# Patient Record
Sex: FEMALE | Race: WHITE | NOT HISPANIC OR LATINO
[De-identification: names, ages, dates, MRNs, and addresses within clinical notes are randomized per-mention and may not be internally consistent; named-entity substitution may affect disease eponyms.]

---

## 2022-06-01 PROBLEM — Z00.00 ENCOUNTER FOR PREVENTIVE HEALTH EXAMINATION: Status: ACTIVE | Noted: 2022-06-01

## 2022-06-02 ENCOUNTER — APPOINTMENT (OUTPATIENT)
Dept: ULTRASOUND IMAGING | Facility: CLINIC | Age: 72
End: 2022-06-02
Payer: MEDICARE

## 2022-06-02 PROCEDURE — 76641 ULTRASOUND BREAST COMPLETE: CPT | Mod: RT

## 2022-11-11 ENCOUNTER — APPOINTMENT (OUTPATIENT)
Dept: ULTRASOUND IMAGING | Facility: CLINIC | Age: 72
End: 2022-11-11

## 2022-11-11 PROCEDURE — 76856 US EXAM PELVIC COMPLETE: CPT

## 2022-12-19 ENCOUNTER — APPOINTMENT (OUTPATIENT)
Dept: MAMMOGRAPHY | Facility: CLINIC | Age: 72
End: 2022-12-19

## 2022-12-19 ENCOUNTER — APPOINTMENT (OUTPATIENT)
Dept: ULTRASOUND IMAGING | Facility: CLINIC | Age: 72
End: 2022-12-19

## 2022-12-19 PROCEDURE — 77063 BREAST TOMOSYNTHESIS BI: CPT

## 2022-12-19 PROCEDURE — 77067 SCR MAMMO BI INCL CAD: CPT

## 2022-12-19 PROCEDURE — 76641 ULTRASOUND BREAST COMPLETE: CPT | Mod: 50

## 2023-02-02 ENCOUNTER — APPOINTMENT (OUTPATIENT)
Dept: RADIOLOGY | Facility: CLINIC | Age: 73
End: 2023-02-02
Payer: MEDICARE

## 2023-02-02 PROCEDURE — 77080 DXA BONE DENSITY AXIAL: CPT

## 2023-10-16 ENCOUNTER — EMERGENCY (EMERGENCY)
Facility: HOSPITAL | Age: 73
LOS: 1 days | Discharge: HOME | End: 2023-10-16
Attending: EMERGENCY MEDICINE | Admitting: EMERGENCY MEDICINE
Payer: MEDICARE

## 2023-10-16 VITALS
HEIGHT: 66 IN | SYSTOLIC BLOOD PRESSURE: 117 MMHG | RESPIRATION RATE: 18 BRPM | OXYGEN SATURATION: 95 % | TEMPERATURE: 99 F | DIASTOLIC BLOOD PRESSURE: 75 MMHG | HEART RATE: 90 BPM | WEIGHT: 184.97 LBS

## 2023-10-16 PROCEDURE — 72125 CT NECK SPINE W/O DYE: CPT | Mod: 26,MH

## 2023-10-16 PROCEDURE — 99284 EMERGENCY DEPT VISIT MOD MDM: CPT

## 2023-10-16 PROCEDURE — 70450 CT HEAD/BRAIN W/O DYE: CPT | Mod: 26,MH

## 2023-10-16 PROCEDURE — 70486 CT MAXILLOFACIAL W/O DYE: CPT | Mod: 26,MH

## 2023-10-16 RX ORDER — TETANUS TOXOID, REDUCED DIPHTHERIA TOXOID AND ACELLULAR PERTUSSIS VACCINE, ADSORBED 5; 2.5; 8; 8; 2.5 [IU]/.5ML; [IU]/.5ML; UG/.5ML; UG/.5ML; UG/.5ML
0.5 SUSPENSION INTRAMUSCULAR ONCE
Refills: 0 | Status: COMPLETED | OUTPATIENT
Start: 2023-10-16 | End: 2023-10-16

## 2023-10-16 RX ADMIN — Medication 300 MILLIGRAM(S): at 15:00

## 2023-10-16 RX ADMIN — TETANUS TOXOID, REDUCED DIPHTHERIA TOXOID AND ACELLULAR PERTUSSIS VACCINE, ADSORBED 0.5 MILLILITER(S): 5; 2.5; 8; 8; 2.5 SUSPENSION INTRAMUSCULAR at 13:24

## 2023-10-16 NOTE — ED PROVIDER NOTE - PHYSICAL EXAMINATION
VITAL SIGNS: I have reviewed nursing notes and confirm.  CONST: Well appearing; No apparent distress.  ENT: +Abrasion and superficial laceration to left upper lip mucosal surface. No gaping laceration. +2 broken upper teeth.   EYES: Sclera clear. Pupils round and symmetrical bilaterally.  RESP: Breathing comfortably; speaking in full sentences.   MSK: No acute deformities noted to extremities. No tenderness to cervical/thoracic/lumbar spine to palpation.  NEURO: Alert, oriented. Speech is fluent and appropriate. Moving all extremities appropriately. No gross motor or sensory abnormalities.  SKIN: +Abrasions to bilateral palms without bony tenderness to palpation of hands or wrists.   PSYCH: Cooperative. Appropriate mood, language, and behavior.

## 2023-10-16 NOTE — ED ADULT NURSE NOTE - NSFALLRISKASMTTYPE_ED_ALL_ED
Initial (On Arrival)

## 2023-10-16 NOTE — ED ADULT TRIAGE NOTE - CHIEF COMPLAINT QUOTE
Pt came in c/o mechanical trip and fall pta. Pt reports chipping her front teeth. Denies loc or ac use.

## 2023-10-16 NOTE — ED PROVIDER NOTE - NSFOLLOWUPINSTRUCTIONS_ED_ALL_ED_FT
Follow up with your dentist ASAP.  Stick to a liquid diet with a straw until you see your dentist.      Tooth Injuries  Tooth injuries include cracked or broken teeth, teeth that have been dislodged or moved out of place, and teeth that have been knocked out of the mouth.    Severe tooth injuries need to be treated quickly to save the tooth. However, sometimes it is not possible to save a tooth after an injury, and the tooth may need to be removed.    What are the causes?  Tooth injuries may be caused by any force that is strong enough to chip, break, dislodge, or knock out a tooth. The injuries may come from:  Sports accidents.  Falls.  Fights.  What increases the risk?  The following factors may make you more likely to lose a tooth:  Playing contact sports, such as football or boxing, without using a mouth guard.  Any medical condition that increases the risk of falling or fainting.  Anything that causes injury to the face.  Any condition that reduces the support of the root of the tooth.  What are the signs or symptoms?  Symptoms of this condition include a tooth that:  May have moved out of position.  May have moved into or out of the tooth socket.  May not be visible in the gums, if the fracture was severe.  Other symptoms of a tooth injury include:  Pain, especially when chewing.  A loose tooth.  Bleeding in or around the tooth.  Swelling or bruising near the tooth.  Swelling or bruising of the lip over the injured tooth.  Increased tooth sensitivity to heat and cold.  A tooth that is knocked out of its place in the gum.  How is this diagnosed?  A tooth injury can be diagnosed with a complete history and a physical exam. You may also need dental X-rays to check for injuries to the root of the tooth.    How is this treated?  Treatment depends on the type of injury and its severity. Treatment may need to be done quickly to save your tooth. Possible treatments include:  Replacing a tooth fragment with a filling, a cap, or a hard, protective cover (crown). This may be an option for a chip or fracture that does not affect the inside of your tooth.  Repairing the inside of the tooth (root canal), if the dentist thinks it is necessary.  The root canal usually needs to be done within a few days of the injury. This may be done to treat a tooth fracture that affects the pulp.  Repositioning a dislodged tooth.  Using a brace or splint to hold the tooth in place.  Replacing a knocked-out tooth in the socket, if possible, and then doing a root canal.  Extracting a tooth. This is done for a fracture that extends below the gums or a fracture that splits the tooth completely.  Taking medicine, including:  Pain medicine.  Antibiotic medicine to help prevent infection.  Follow these instructions at home:  Medicines    Take over-the-counter and prescription medicines only as told by your dentist.  Take your antibiotic medicine as told by your dentist. Do not stop taking the antibiotic even if you start to feel better.  Do not drive or use heavy machinery while taking prescription pain medicine.  Caring for your teeth      Do not eat or chew on very hard objects. These include ice cubes, pens, pencils, hard candy, and popcorn kernels.  Do not clench or grind your teeth. Tell your dentist if you grind your teeth while you sleep.  Brush your teeth gently as directed by your dentist.  Do not use your teeth to open packages.  Always wear mouth protection when you play contact sports.  Managing pain and swelling    Gargle with a mixture of salt and water 3–4 times a day or as needed. To make salt water, completely dissolve ½–1 tsp (3–6 g) of salt in 1 cup (237 mL) of warm water.  If directed, apply ice to your mouth near the injured tooth:  Put ice in a plastic bag.  Place a towel between your skin and the bag.  Leave the ice on for 20 minutes, 2–3 times a day.  Remove the ice if your skin turns bright red. This is very important. If you cannot feel pain, heat, or cold, you have a greater risk of damage to the area.  General instructions    Do not use any products that contain nicotine or tobacco. These products include cigarettes, chewing tobacco, and vaping devices, such as e-cigarettes. If you need help quitting, ask your health care provider.  Your health care provider may recommend that you eat certain foods. This may include eating only soft foods.  Check the injured area every day for signs of infection. Watch for:  Redness, swelling, or pain.  Fluid, blood, or pus.  Keep all follow-up visits. This is important.  Contact a dental care provider if:  Your pain gets much worse, even after you take pain medicine.  You have pus coming from the site of the tooth injury.  You develop swelling near your injured tooth.  You have a tooth splint, and it becomes loose.  Your tooth becomes loose.  Get help right away if:  You have swelling in the face.  You have a fever.  You have bleeding near the tooth that does not stop in 10 minutes.  You have trouble swallowing.  You have trouble opening your mouth.  Your permanent tooth comes out after it is repositioned.  Summary  Tooth injuries include cracked or broken teeth and teeth that have been dislodged or knocked out of the mouth.  A tooth injury can be diagnosed with a medical history and a physical exam. You may also need dental X-rays to check for injuries to the root of the tooth.  Treatment depends on the type of injury you have and its severity. Treatment may need to be done quickly to save your tooth.  This information is not intended to replace advice given to you by your health care provider. Make sure you discuss any questions you have with your health care provider.

## 2023-10-16 NOTE — ED PROVIDER NOTE - CROS ED GI ALL NEG
Health Maintenance Due   Topic Date Due   • COVID-19 Vaccine (1) Never done   • Influenza Vaccine (1) 09/01/2022   • Annual Physical (ages 3-18)  03/21/2023       Patient is due for topics listed above, she wishes to proceed with Annual Wellness Visit (ages 3-18), but is not proceeding with Immunization(s) COVID-19 and Influenza at this time.      negative...

## 2023-10-16 NOTE — ED PROVIDER NOTE - PATIENT PORTAL LINK FT
You can access the FollowMyHealth Patient Portal offered by [facility] by registering at the following website: [email or web address]. By joining [facility]’s FollowMyHealth portal, you will also be able to view your health information using other applications (apps) compatible with our system.

## 2023-10-16 NOTE — ED PROVIDER NOTE - PROGRESS NOTE DETAILS
Imaging negative.  Pt has follow up w dentist within 48h.  Instructed to keep to a liquid diet until then.  No dental kit in ED.  Pt will get topical covering for broken teeth over the counter.  Will give PO clindamycin (pt has penicillin allergy) for infection ppx.

## 2023-10-16 NOTE — ED PROVIDER NOTE - OBJECTIVE STATEMENT
74 y/o F with no significant Hx presents s/p mechanical trip and fall across the street with abrasion to inner upper lip and chipped teeth after landing on her outstretched hands and hitting her upper lip. She reports she was at a pool class at the [employer] across the street this morning and as she was leaving she tripped on the sidewalk, two bystanders helped her up and walked her to the ED. Denies any pain at this time. Denies LOC, no nausea, no vomiting. Not on blood thinners. Last tetanus unknown. Patient reports she has made a dentist appointment for Wednesday.

## 2023-10-16 NOTE — ED PROVIDER NOTE - CLINICAL SUMMARY MEDICAL DECISION MAKING FREE TEXT BOX
72 y/o F with no significant PMHx presents after mechanical trip and fall with abrasions to bilateral arms, left upper lip, and 2 broken teeth. On exam no gaping laceration requiring repair. No neuro deficits. No bony tenderness of hands or wrists. Plan for tetanus, update CT head and C-spine, analgesia, and reassess.

## 2023-10-18 DIAGNOSIS — S60.512A ABRASION OF LEFT HAND, INITIAL ENCOUNTER: ICD-10-CM
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
## 2023-10-18 DIAGNOSIS — S00.511A ABRASION OF LIP, INITIAL ENCOUNTER: ICD-10-CM
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
## 2023-10-18 DIAGNOSIS — S60.511A ABRASION OF RIGHT HAND, INITIAL ENCOUNTER: ICD-10-CM

## 2023-10-18 DIAGNOSIS — Z23 ENCOUNTER FOR IMMUNIZATION: ICD-10-CM

## 2023-10-18 DIAGNOSIS — K03.81 CRACKED TOOTH: ICD-10-CM

## 2023-10-18 DIAGNOSIS — S40.812A ABRASION OF LEFT UPPER ARM, INITIAL ENCOUNTER: ICD-10-CM

## 2023-10-18 DIAGNOSIS — S40.811A ABRASION OF RIGHT UPPER ARM, INITIAL ENCOUNTER: ICD-10-CM

## 2023-10-18 DIAGNOSIS — S02.5XXA FRACTURE OF TOOTH (TRAUMATIC), INITIAL ENCOUNTER FOR CLOSED FRACTURE: ICD-10-CM

## 2023-11-13 PROBLEM — Z78.9 OTHER SPECIFIED HEALTH STATUS: Chronic | Status: ACTIVE | Noted: 2023-10-16

## 2023-11-21 ENCOUNTER — APPOINTMENT (OUTPATIENT)
Dept: ULTRASOUND IMAGING | Facility: CLINIC | Age: 73
End: 2023-11-21
Payer: MEDICARE

## 2023-11-21 PROCEDURE — 76856 US EXAM PELVIC COMPLETE: CPT

## 2023-12-21 ENCOUNTER — APPOINTMENT (OUTPATIENT)
Dept: ULTRASOUND IMAGING | Facility: CLINIC | Age: 73
End: 2023-12-21
Payer: MEDICARE

## 2023-12-21 ENCOUNTER — APPOINTMENT (OUTPATIENT)
Dept: MAMMOGRAPHY | Facility: CLINIC | Age: 73
End: 2023-12-21
Payer: MEDICARE

## 2023-12-21 PROCEDURE — 77063 BREAST TOMOSYNTHESIS BI: CPT

## 2023-12-21 PROCEDURE — 76641 ULTRASOUND BREAST COMPLETE: CPT | Mod: 50,3G

## 2023-12-21 PROCEDURE — 77067 SCR MAMMO BI INCL CAD: CPT

## 2024-06-05 ENCOUNTER — RX ONLY (RX ONLY)
Age: 74
End: 2024-06-05

## 2024-06-05 ENCOUNTER — OFFICE (OUTPATIENT)
Dept: URBAN - METROPOLITAN AREA CLINIC 28 | Facility: CLINIC | Age: 74
Setting detail: OPHTHALMOLOGY
End: 2024-06-05
Payer: MEDICARE

## 2024-06-05 DIAGNOSIS — H16.223: ICD-10-CM

## 2024-06-05 DIAGNOSIS — H01.001: ICD-10-CM

## 2024-06-05 DIAGNOSIS — H25.13: ICD-10-CM

## 2024-06-05 DIAGNOSIS — H52.4: ICD-10-CM

## 2024-06-05 DIAGNOSIS — H01.004: ICD-10-CM

## 2024-06-05 PROBLEM — H52.7 REFRACTIVE ERROR: Status: ACTIVE | Noted: 2024-06-05

## 2024-06-05 PROCEDURE — 92015 DETERMINE REFRACTIVE STATE: CPT | Performed by: OPHTHALMOLOGY

## 2024-06-05 PROCEDURE — 92004 COMPRE OPH EXAM NEW PT 1/>: CPT | Performed by: OPHTHALMOLOGY

## 2024-06-05 ASSESSMENT — LID EXAM ASSESSMENTS
OS_BLEPHARITIS: LUL 1+
OD_BLEPHARITIS: RUL 1+

## 2024-06-05 ASSESSMENT — CONFRONTATIONAL VISUAL FIELD TEST (CVF)
OD_FINDINGS: FULL
OS_FINDINGS: FULL

## 2024-10-29 ASSESSMENT — REFRACTION_MANIFEST
OS_SPHERE: -4.00
OS_AXIS: 120
OS_VA1: 20/20
OD_ADD: +3.00
OD_VA1: 20/20
OD_SPHERE: -4.75
OS_CYLINDER: -1.00
OS_ADD: +3.00

## 2024-10-29 ASSESSMENT — REFRACTION_CURRENTRX
OS_SPHERE: -3.75
OD_ADD: +2.75
OD_VPRISM_DIRECTION: PROGS
OS_VPRISM_DIRECTION: PROGS
OD_OVR_VA: 20/
OS_OVR_VA: 20/
OD_AXIS: 007
OS_CYLINDER: -0.25
OD_SPHERE: -4.00
OS_AXIS: 143
OS_ADD: +2.75
OD_CYLINDER: -0.25

## 2024-10-29 ASSESSMENT — KERATOMETRY
OD_AXISANGLE_DEGREES: 001
OS_K1POWER_DIOPTERS: 44.50
OS_AXISANGLE_DEGREES: 073
OD_K1POWER_DIOPTERS: 44.75
OD_K2POWER_DIOPTERS: 45.50
OS_K2POWER_DIOPTERS: 45.50

## 2024-12-26 ENCOUNTER — APPOINTMENT (OUTPATIENT)
Dept: MAMMOGRAPHY | Facility: CLINIC | Age: 74
End: 2024-12-26
Payer: MEDICARE

## 2024-12-26 ENCOUNTER — APPOINTMENT (OUTPATIENT)
Dept: ULTRASOUND IMAGING | Facility: CLINIC | Age: 74
End: 2024-12-26

## 2024-12-26 PROCEDURE — 77063 BREAST TOMOSYNTHESIS BI: CPT

## 2024-12-26 PROCEDURE — 76641 ULTRASOUND BREAST COMPLETE: CPT | Mod: 50,GA

## 2024-12-26 PROCEDURE — 77067 SCR MAMMO BI INCL CAD: CPT

## 2025-02-03 ENCOUNTER — APPOINTMENT (OUTPATIENT)
Dept: ULTRASOUND IMAGING | Facility: CLINIC | Age: 75
End: 2025-02-03
Payer: MEDICARE

## 2025-02-03 PROCEDURE — 76856 US EXAM PELVIC COMPLETE: CPT

## 2025-07-15 ENCOUNTER — OFFICE (OUTPATIENT)
Dept: URBAN - METROPOLITAN AREA CLINIC 28 | Facility: CLINIC | Age: 75
Setting detail: OPHTHALMOLOGY
End: 2025-07-15
Payer: MEDICARE

## 2025-07-15 DIAGNOSIS — H16.223: ICD-10-CM

## 2025-07-15 DIAGNOSIS — H01.001: ICD-10-CM

## 2025-07-15 DIAGNOSIS — H25.13: ICD-10-CM

## 2025-07-15 DIAGNOSIS — H52.7: ICD-10-CM

## 2025-07-15 DIAGNOSIS — H01.004: ICD-10-CM

## 2025-07-15 PROCEDURE — 92014 COMPRE OPH EXAM EST PT 1/>: CPT | Performed by: OPHTHALMOLOGY

## 2025-07-15 ASSESSMENT — REFRACTION_MANIFEST
OS_CYLINDER: -1.00
OS_VA1: 20/20
OS_AXIS: 120
OS_ADD: +3.00
OD_VA1: 20/20
OS_SPHERE: -4.00
OD_SPHERE: -4.75
OD_ADD: +3.00

## 2025-07-15 ASSESSMENT — KERATOMETRY
OS_K2POWER_DIOPTERS: 45.50
OD_K2POWER_DIOPTERS: 45.25
OS_K1POWER_DIOPTERS: 44.50
OD_K1POWER_DIOPTERS: 44.50
OD_AXISANGLE_DEGREES: 003
OS_AXISANGLE_DEGREES: 070

## 2025-07-15 ASSESSMENT — REFRACTION_CURRENTRX
OS_CYLINDER: -1.00
OS_SPHERE: -3.75
OD_AXIS: 066
OS_ADD: +2.00
OD_ADD: +2.50
OD_VPRISM_DIRECTION: PROGS
OD_CYLINDER: -0.25
OS_VPRISM_DIRECTION: PROGS
OS_AXIS: 122
OD_SPHERE: -4.50
OS_OVR_VA: 20/
OD_OVR_VA: 20/

## 2025-07-15 ASSESSMENT — TONOMETRY
OD_IOP_MMHG: 11
OS_IOP_MMHG: 11

## 2025-07-15 ASSESSMENT — TEAR BREAK UP TIME (TBUT)
OD_TBUT: 2+
OS_TBUT: 2+

## 2025-07-15 ASSESSMENT — REFRACTION_AUTOREFRACTION
OD_AXIS: 095
OS_SPHERE: -4.25
OD_CYLINDER: -0.50
OD_SPHERE: -4.50
OS_AXIS: 113
OS_CYLINDER: -0.75

## 2025-07-15 ASSESSMENT — VISUAL ACUITY
OD_BCVA: 20/50-2
OS_BCVA: 20/50-1

## 2025-07-15 ASSESSMENT — LID EXAM ASSESSMENTS
OS_BLEPHARITIS: LUL 1+
OD_BLEPHARITIS: RUL 1+

## 2025-07-15 ASSESSMENT — CONFRONTATIONAL VISUAL FIELD TEST (CVF)
OD_FINDINGS: FULL
OS_FINDINGS: FULL

## 2025-09-15 ENCOUNTER — APPOINTMENT (OUTPATIENT)
Dept: CT IMAGING | Facility: HOSPITAL | Age: 75
End: 2025-09-15